# Patient Record
Sex: MALE | Race: WHITE | Employment: UNEMPLOYED | URBAN - METROPOLITAN AREA
[De-identification: names, ages, dates, MRNs, and addresses within clinical notes are randomized per-mention and may not be internally consistent; named-entity substitution may affect disease eponyms.]

---

## 2022-03-26 ENCOUNTER — HOSPITAL ENCOUNTER (EMERGENCY)
Age: 22
Discharge: HOME | End: 2022-03-29
Attending: EMERGENCY MEDICINE
Payer: MEDICAID

## 2022-03-26 DIAGNOSIS — R46.89 AGGRESSIVE BEHAVIOR: Primary | ICD-10-CM

## 2022-03-26 DIAGNOSIS — Z00.8 MEDICAL CLEARANCE FOR PSYCHIATRIC ADMISSION: ICD-10-CM

## 2022-03-26 LAB
ALBUMIN SERPL-MCNC: 4.1 G/DL (ref 3.5–5)
ALBUMIN/GLOB SERPL: 1.1 {RATIO} (ref 1.1–2.2)
ALP SERPL-CCNC: 70 U/L (ref 45–117)
ALT SERPL-CCNC: 115 U/L (ref 12–78)
AMPHET UR QL SCN: NEGATIVE
ANION GAP SERPL CALC-SCNC: 11 MMOL/L (ref 5–15)
AST SERPL-CCNC: 59 U/L (ref 15–37)
BARBITURATES UR QL SCN: NEGATIVE
BASOPHILS # BLD: 0 K/UL (ref 0–0.1)
BASOPHILS NFR BLD: 0 % (ref 0–1)
BENZODIAZ UR QL: NEGATIVE
BILIRUB SERPL-MCNC: 0.4 MG/DL (ref 0.2–1)
BUN SERPL-MCNC: 12 MG/DL (ref 6–20)
BUN/CREAT SERPL: 14 (ref 12–20)
CALCIUM SERPL-MCNC: 9.3 MG/DL (ref 8.5–10.1)
CANNABINOIDS UR QL SCN: NEGATIVE
CHLORIDE SERPL-SCNC: 105 MMOL/L (ref 97–108)
CO2 SERPL-SCNC: 25 MMOL/L (ref 21–32)
COCAINE UR QL SCN: NEGATIVE
CREAT SERPL-MCNC: 0.87 MG/DL (ref 0.7–1.3)
DIFFERENTIAL METHOD BLD: ABNORMAL
DRUG SCRN COMMENT,DRGCM: NORMAL
EOSINOPHIL # BLD: 0.1 K/UL (ref 0–0.4)
EOSINOPHIL NFR BLD: 1 % (ref 0–7)
ERYTHROCYTE [DISTWIDTH] IN BLOOD BY AUTOMATED COUNT: 12.4 % (ref 11.5–14.5)
ETHANOL SERPL-MCNC: <10 MG/DL
FLUAV RNA SPEC QL NAA+PROBE: NOT DETECTED
FLUBV RNA SPEC QL NAA+PROBE: NOT DETECTED
GLOBULIN SER CALC-MCNC: 3.8 G/DL (ref 2–4)
GLUCOSE SERPL-MCNC: 165 MG/DL (ref 65–100)
HCT VFR BLD AUTO: 47.7 % (ref 36.6–50.3)
HGB BLD-MCNC: 15.8 G/DL (ref 12.1–17)
IMM GRANULOCYTES # BLD AUTO: 0 K/UL (ref 0–0.04)
IMM GRANULOCYTES NFR BLD AUTO: 0 % (ref 0–0.5)
LYMPHOCYTES # BLD: 2.6 K/UL (ref 0.8–3.5)
LYMPHOCYTES NFR BLD: 34 % (ref 12–49)
MCH RBC QN AUTO: 28.9 PG (ref 26–34)
MCHC RBC AUTO-ENTMCNC: 33.1 G/DL (ref 30–36.5)
MCV RBC AUTO: 87.2 FL (ref 80–99)
METHADONE UR QL: NEGATIVE
MONOCYTES # BLD: 0.3 K/UL (ref 0–1)
MONOCYTES NFR BLD: 4 % (ref 5–13)
NEUTS SEG # BLD: 4.7 K/UL (ref 1.8–8)
NEUTS SEG NFR BLD: 61 % (ref 32–75)
NRBC # BLD: 0 K/UL (ref 0–0.01)
NRBC BLD-RTO: 0 PER 100 WBC
OPIATES UR QL: NEGATIVE
PCP UR QL: NEGATIVE
PLATELET # BLD AUTO: 250 K/UL (ref 150–400)
PMV BLD AUTO: 8.7 FL (ref 8.9–12.9)
POTASSIUM SERPL-SCNC: 3.8 MMOL/L (ref 3.5–5.1)
PROT SERPL-MCNC: 7.9 G/DL (ref 6.4–8.2)
RBC # BLD AUTO: 5.47 M/UL (ref 4.1–5.7)
SARS-COV-2, COV2: NOT DETECTED
SODIUM SERPL-SCNC: 141 MMOL/L (ref 136–145)
WBC # BLD AUTO: 7.7 K/UL (ref 4.1–11.1)

## 2022-03-26 PROCEDURE — 80307 DRUG TEST PRSMV CHEM ANLYZR: CPT

## 2022-03-26 PROCEDURE — 82077 ASSAY SPEC XCP UR&BREATH IA: CPT

## 2022-03-26 PROCEDURE — 99285 EMERGENCY DEPT VISIT HI MDM: CPT

## 2022-03-26 PROCEDURE — 80053 COMPREHEN METABOLIC PANEL: CPT

## 2022-03-26 PROCEDURE — 87636 SARSCOV2 & INF A&B AMP PRB: CPT

## 2022-03-26 PROCEDURE — 74011250637 HC RX REV CODE- 250/637: Performed by: EMERGENCY MEDICINE

## 2022-03-26 PROCEDURE — 36415 COLL VENOUS BLD VENIPUNCTURE: CPT

## 2022-03-26 PROCEDURE — 85025 COMPLETE CBC W/AUTO DIFF WBC: CPT

## 2022-03-26 RX ORDER — LORAZEPAM 1 MG/1
1 TABLET ORAL
Status: COMPLETED | OUTPATIENT
Start: 2022-03-26 | End: 2022-03-26

## 2022-03-26 RX ADMIN — LORAZEPAM 1 MG: 1 TABLET ORAL at 20:58
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
## 2022-03-26 NOTE — ED NOTES
Pt has bilateral wrist in handcuffs in the front of body. [place] [employer] at bedside. Nursing supervisor and Security made aware. Skin around handcuffs is dry, intact, and no sign of redness or breakdown. \"Metal restaint in use\" sign up on door.

## 2022-03-26 NOTE — ED NOTES
Pt provided with [place] ([facility]) sandwich, ginger ale and warm blanket.
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
## 2022-03-26 NOTE — ED NOTES
Bedside and Verbal shift change report given to Junito Paez RN (oncoming nurse) by Blaine Ramos RN (offgoing nurse). Report included the following information SBAR, ED Summary, MAR and Recent Results.

## 2022-03-27 RX ORDER — DIVALPROEX SODIUM 500 MG/1
1500 TABLET, EXTENDED RELEASE ORAL
COMMUNITY

## 2022-03-27 RX ORDER — HALOPERIDOL DECANOATE 100 MG/ML
100 INJECTION INTRAMUSCULAR
COMMUNITY

## 2022-03-27 RX ORDER — ACETAMINOPHEN 500 MG
2000 TABLET ORAL DAILY
COMMUNITY

## 2022-03-27 RX ORDER — LORAZEPAM 1 MG/1
1 TABLET ORAL
Status: ACTIVE | OUTPATIENT
Start: 2022-03-27 | End: 2022-03-27

## 2022-03-27 RX ORDER — CLONAZEPAM 2 MG/1
2 TABLET ORAL 2 TIMES DAILY
COMMUNITY

## 2022-03-27 RX ORDER — LORAZEPAM 1 MG/1
1 TABLET ORAL
COMMUNITY

## 2022-03-27 RX ORDER — LEVOTHYROXINE SODIUM 50 UG/1
50 TABLET ORAL
COMMUNITY

## 2022-03-27 RX ORDER — CHOLECALCIFEROL (VITAMIN D3) 50 MCG
CAPSULE ORAL
COMMUNITY
End: 2022-03-28

## 2022-03-27 RX ORDER — LISINOPRIL 10 MG/1
10 TABLET ORAL DAILY
COMMUNITY

## 2022-03-27 RX ORDER — PROPRANOLOL HYDROCHLORIDE 120 MG/1
120 CAPSULE, EXTENDED RELEASE ORAL
COMMUNITY

## 2022-03-27 NOTE — ED NOTES
Patients medications delivered, home medications stored and labeled in black belongings locker labeled, bagged and sealed.

## 2022-03-27 NOTE — ED NOTES
Pt taken out of handcuffs by [employer] to draw blood work. Skin on wrist in is dry, intact, and no signs of redness or breakdown.

## 2022-03-27 NOTE — ED NOTES
Spoke with Lexx Doss (alton) from the [facility] (group home) about the patient's home medications. Please call [phone] for any questions or concerns.

## 2022-03-27 NOTE — ED NOTES
Spoke w/ Pt mother Aletha Gu, #[phone]. Mother is upset with the process so far, and issues with group home getting medication list and physical medications. Updated mother on current plan of care and TDO status. Transferred Mother to pt room, pt currently speaking with mom.

## 2022-03-27 NOTE — ED NOTES
Spoke w/ Mykole w/ [facility] she asked to fax over lab results. Fax confirmation received. Called back RE[facility] to confirm fax was received. LVM for call back.

## 2022-03-27 NOTE — ED NOTES
Pt becoming frustrated with process. Stating he wants to go to [facility] I explained we cannot transfer him until we have the forms completed from mom, and the medication. Pt escalating and becoming more agitated.

## 2022-03-27 NOTE — ED NOTES
Verbal shift change report given to Karyle Pee, RN (oncoming nurse) by Antolin De La Garza RN (offgoing nurse). Report included the following information SBAR, ED Summary, Intake/Output, MAR and Recent Results.

## 2022-03-27 NOTE — ED NOTES
Bedside and Verbal shift change report given to Gracie Zhang RN (oncoming nurse) by Ginny Gordillo RN (offgoing nurse). Report included the following information SBAR.

## 2022-03-27 NOTE — ED NOTES
Spoke w/ Mr. Mervat Saini #[phone], Manager of the group home. Mr. Mervat Saini stated he doesn't have anyone that is able to bring the pts medications until tomorrow. Called LESLIE Tamayo #[phone](QUINCY) she advised she is waiting for group [facility] to fax over medication transcripts to complete the paperwork but pt will not be able to be transferred to [facility] crisis stabilization home for [facility] until we have his medications. Bear emailed me the paperwork for ED MD to complete.  Unable to complete the medication order form until we receive the med transcript list.

## 2022-03-27 NOTE — ED NOTES
Spoke w/ [facility] they advised that Pts mother is unable to complete the admission forms due to not having access to a printer, ED MD not willing to complete the forms RE[facility] is requesting per [facility] policy, and pt unable to be transferred to [facility] without his medications. Seema Ray w/ [facility] looped in.

## 2022-03-27 NOTE — ED NOTES
Pt asked to call his mother, pt seems to be agitated about the watch, Yocasta Lugo MD aware, will continue to monitor.

## 2022-03-27 NOTE — ED NOTES
Spoke with Karel Miranda ([facility]), pt currently on 7th hour TDO, stated that currently there are no beds available, still searching for bed as no facility has accepted the pt.

## 2022-03-27 NOTE — ED NOTES
Paul Points with [facility] stated she is giving [facility] 5-10mins to come to a resolution for this Pt or she will be pursuing a TDO so pt isn't in limbo until tomorrow which is the current update for [facility].

## 2022-03-28 PROCEDURE — 74011250637 HC RX REV CODE- 250/637: Performed by: EMERGENCY MEDICINE

## 2022-03-28 RX ORDER — DIVALPROEX SODIUM 500 MG/1
1500 TABLET, EXTENDED RELEASE ORAL
Status: DISCONTINUED | OUTPATIENT
Start: 2022-03-28 | End: 2022-03-29 | Stop reason: HOSPADM

## 2022-03-28 RX ORDER — LORAZEPAM 1 MG/1
1 TABLET ORAL
Status: DISCONTINUED | OUTPATIENT
Start: 2022-03-28 | End: 2022-03-29 | Stop reason: HOSPADM

## 2022-03-28 RX ORDER — OMEGA-3-ACID ETHYL ESTERS 1 G/1
1 CAPSULE, LIQUID FILLED ORAL DAILY
Status: DISCONTINUED | OUTPATIENT
Start: 2022-03-29 | End: 2022-03-28

## 2022-03-28 RX ORDER — LORAZEPAM 1 MG/1
1 TABLET ORAL AS NEEDED
Status: DISCONTINUED | OUTPATIENT
Start: 2022-03-28 | End: 2022-03-28

## 2022-03-28 RX ORDER — LISINOPRIL 5 MG/1
10 TABLET ORAL DAILY
Status: DISCONTINUED | OUTPATIENT
Start: 2022-03-29 | End: 2022-03-29 | Stop reason: HOSPADM

## 2022-03-28 RX ORDER — CLONAZEPAM 0.5 MG/1
2 TABLET ORAL 2 TIMES DAILY
Status: DISCONTINUED | OUTPATIENT
Start: 2022-03-28 | End: 2022-03-29 | Stop reason: HOSPADM

## 2022-03-28 RX ORDER — CHOLECALCIFEROL (VITAMIN D3) 50 MCG
1 CAPSULE ORAL
COMMUNITY

## 2022-03-28 RX ORDER — MELATONIN
2000 DAILY
Status: DISCONTINUED | OUTPATIENT
Start: 2022-03-29 | End: 2022-03-29 | Stop reason: HOSPADM

## 2022-03-28 RX ORDER — LEVOTHYROXINE SODIUM 50 UG/1
50 TABLET ORAL
Status: DISCONTINUED | OUTPATIENT
Start: 2022-03-29 | End: 2022-03-29 | Stop reason: HOSPADM

## 2022-03-28 RX ORDER — LORAZEPAM 1 MG/1
1 TABLET ORAL
Status: DISCONTINUED | OUTPATIENT
Start: 2022-03-28 | End: 2022-03-28 | Stop reason: SDUPTHER

## 2022-03-28 RX ORDER — PROPRANOLOL HYDROCHLORIDE 60 MG/1
120 CAPSULE, EXTENDED RELEASE ORAL
Status: DISCONTINUED | OUTPATIENT
Start: 2022-03-28 | End: 2022-03-29 | Stop reason: HOSPADM

## 2022-03-28 RX ADMIN — PROPRANOLOL HYDROCHLORIDE 120 MG: 60 CAPSULE, EXTENDED RELEASE ORAL at 21:26

## 2022-03-28 RX ADMIN — CLONAZEPAM 2 MG: 0.5 TABLET ORAL at 19:15

## 2022-03-28 RX ADMIN — LORAZEPAM 1 MG: 1 TABLET ORAL at 03:37

## 2022-03-28 RX ADMIN — DIVALPROEX SODIUM 1500 MG: 500 TABLET, EXTENDED RELEASE ORAL at 21:26

## 2022-03-28 NOTE — ED NOTES
This RN spoke to Angelito with Crisis. She says that the patient will most likely have their case escalated to [facility] and have a hearing tomorrow. Angelito says she is trying to get the hearing for Tuesday to give us more time for a bed search.

## 2022-03-28 NOTE — ED NOTES
Pt placed in paper scrubs, two belonging bags with clothes and shoes labeled and wanded by security.

## 2022-03-28 NOTE — PROGRESS NOTES
[facility]: MED RECONCILIATION    Comments/Recommendations:   [facility] group [facility] reports patient has only been with them since last Thursday  Per group home, next haloperidol decanoate injection is due 22    Medications adjusted:  Divalproex ER to bedtime  Propranolol SR to bedtime  Fish oil to daily with dinner    Information obtained from: Staff member at [employer] [facility] [place] ([phone])    Allergies: Patient has no known allergies. Prior to Admission Medications:   Prior to Admission Medications   Prescriptions Last Dose Informant Patient Reported? Taking? LORazepam (ATIVAN) 1 mg tablet  Care Giver Yes Yes   Sig: Take 1 mg by mouth every six (6) hours as needed for Anxiety. cholecalciferol (VITAMIN D3) (2,000 UNITS /50 MCG) cap capsule  Care Giver Yes Yes   Sig: Take 2,000 Units by mouth daily. Indications: low vitamin D levels   clonazePAM (KlonoPIN) 2 mg tablet  Care Giver Yes Yes   Sig: Take 2 mg by mouth two (2) times a day. divalproex ER (Depakote ER) 500 mg ER tablet  Care Giver Yes Yes   Sig: Take 1,500 mg by mouth nightly.   haloperidol decanoate (HALDOL DECANOATE) 100 mg/mL injection Next due 22 Care Giver Yes Yes   Si mg by IntraMUSCular route every twenty-eight (28) days. levothyroxine (SYNTHROID) 50 mcg tablet  Care Giver Yes Yes   Sig: Take 50 mcg by mouth Daily (before breakfast). Indications: a condition with low thyroid hormone levels   lisinopriL (PRINIVIL, ZESTRIL) 10 mg tablet  Care Giver Yes Yes   Sig: Take 10 mg by mouth daily. omega 3-dha-epa-fish oil (Fish OiL) 100-160-1,000 mg cap  Care Giver Yes Yes   Sig: Take 1 Capsule by mouth daily (with dinner). propranolol LA (INDERAL LA) 120 mg SR capsule  Care Giver Yes Yes   Sig: Take 120 mg by mouth nightly.         Thank you,  Pamela Carey, PharmD, BCPS  [phone]

## 2022-03-28 NOTE — ED NOTES
Pt updated on POC. He says he may need something to help him sleep. 1 mg PO Ativan verbal order from Dr. Chema Sinha. Pt in room watching youtube videos and laughing.

## 2022-03-28 NOTE — ED NOTES
Lexa Conteh was at bedside to talk to patient, left at this time and told patient that Diedra July would be seeing him soon.  outside room to monitor patient. 1400: Diedra July,  at bedside at this time.
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
## 2022-03-28 NOTE — PROGRESS NOTES
Pharmacy Note    This patients fish oil has been discontinued during the hospitalization per hospital policy. If deemed medically necessary, the medication can be reordered by the prescriber. The patient will then be asked to bring in their own supply of medication.     Thank you,  Jaylen Boles, PharmD, BCPS  [phone]

## 2022-03-28 NOTE — ED NOTES
Per courts, they spoke with patients mother who is legal guardian and stated to the courts that reach was supposed to be attempting to get patient into a stabilization treatment program and once that was completed he would be allowed back into the group home.

## 2022-03-28 NOTE — ED NOTES
Mother, German Griffin, contacted [facility] program for admission into their program. REACH directed to speak to [facility].

## 2022-03-28 NOTE — ED NOTES
Patient has been instructed that they have been given ativan* which contains opioids, benzodiazepines, or other sedating drugs. Patient is aware that they  will need to refrain from driving or operating heavy machinery after taking this medication. Patient also instructed that they need to avoid drinking alcohol and using other products containing opioids, benzodiazepines, or other sedating drugs. Patient verbalized understanding.       Pt is being admitted

## 2022-03-28 NOTE — PROGRESS NOTES
CM received consult from ED for patient who was brought to ED under an Emergency Custody Order, and was subsequently TDOd. The Commitment hearing was held today and patient was committed for mental health treatment. Courtney Sanchez in [facility] is working on finding a bed for him. JORDIN spoke with Courtney Sanchez, and he does not need CM assistance at this time, as he will speak with all relevant parties.

## 2022-03-29 VITALS
TEMPERATURE: 97.6 F | DIASTOLIC BLOOD PRESSURE: 97 MMHG | HEIGHT: 73 IN | RESPIRATION RATE: 18 BRPM | HEART RATE: 97 BPM | BODY MASS INDEX: 38.43 KG/M2 | SYSTOLIC BLOOD PRESSURE: 139 MMHG | WEIGHT: 290 LBS | OXYGEN SATURATION: 99 %

## 2022-03-29 LAB
FLUAV RNA SPEC QL NAA+PROBE: NOT DETECTED
FLUBV RNA SPEC QL NAA+PROBE: NOT DETECTED
SARS-COV-2, COV2: NOT DETECTED

## 2022-03-29 PROCEDURE — 87636 SARSCOV2 & INF A&B AMP PRB: CPT

## 2022-03-29 PROCEDURE — 74011250637 HC RX REV CODE- 250/637: Performed by: EMERGENCY MEDICINE

## 2022-03-29 RX ADMIN — Medication 2000 UNITS: at 10:12

## 2022-03-29 RX ADMIN — LISINOPRIL 10 MG: 5 TABLET ORAL at 10:12

## 2022-03-29 RX ADMIN — CLONAZEPAM 2 MG: 0.5 TABLET ORAL at 10:13

## 2022-03-29 RX ADMIN — LEVOTHYROXINE SODIUM 50 MCG: 50 TABLET ORAL at 08:14

## 2022-03-29 NOTE — ED NOTES
Patient resting in bed quietly. Lights dimmed to comfort. Sitter at bedside. Pt cooperative with night time medications.

## 2022-03-29 NOTE — CONSULTS
PSYCHIATRY CONSULT NOTE:    REASON FOR CONSULT:  depression and agitation  out of control behavior    HISTORY OF PRESENTING COMPLAINT:  Dagmar Price is a 23 y.o. male who was brought by [facility] for ECO for mental health evaluation. Police report an altercation at a group home, patient denies episode today. Patient calm and cooperative on arrival.  Patient denies SI or HI. Patient denies hallucinations or delusions. Patient states \"I'd just like to go back to my group home. \"      Dagmar Price reports feeling well and moods are good. Says that he was on an outing from the group home and when they returned, the police were there and took him out. Denies SI/HI/AH/VH. Denies getting into and altercation or breaking anything up in the home. No aggression or violence. Appropriately interactive and aware. Tolerating medications well. Eating and sleeping fairly. States that he wants to go home now in a semi-childlike manor. The officer on duty reports no behaviors and states that the PD did not take out the ECO on him but the group home. 3/29 - Dagmar Price reports feeling well and moods are good. Denies SI/HI/AH/VH. No aggression or violence. Appropriately interactive and aware. Tolerating medications well. Eating and sleeping fairly. Interested in returning to his group home. Notes that he was in a hospital prior to his group home and his mother does not want him to go to another hospital.  When asked about his interactions at the group home, he stated that he gets nervous around \"jumpy\" animated people and the people in the home were elevated by his ascertion. When he is among such chaos, he tends to want to leave he stated. This will explain his elopement behaviors. When he feels trapped then he lashes out explaining the other behaviors prior to ED visit.   He has been calm and cooperative with this interviewer and by all accounts per record for 3 days and does not meet inpatient criteria. By report he needs a stabilization program prior to returning to his group home. The best plan in this case would be for [facility] to place him in a [facility] that has the programing and facility to manage his level of care and needs. PAST PSYCHIATRIC HISTORY:  Therapy, Out Patient ID    SUBSTANCE ABUSE HISTORY:  Social History     Substance and Sexual Activity   Drug Use Never     Social History     Substance and Sexual Activity   Alcohol Use Not Currently     Social History     Tobacco Use   Smoking Status Never Smoker   Smokeless Tobacco Never Used       PAST MEDICAL HISTORY:  History reviewed. No pertinent past medical history. SOCIAL HISTORY:  Lives in a group home. Denies SI/HI/AH/VH. VITALS:  Visit Vitals  /71 (BP 1 Location: Left upper arm, BP Patient Position: At rest)   Pulse 95   Temp 98 °F (36.7 °C)   Resp 17   Ht 6' 1\" (1.854 m)   Wt 131.5 kg (290 lb)   SpO2 98%   BMI 38.26 kg/m²       MEDICATIONS:    Current Facility-Administered Medications:     cholecalciferol (VITAMIN D3) (1000 Units /25 mcg) tablet 2,000 Units, 2,000 Units, Oral, DAILY, Erin Ruth MD    clonazePAM (KlonoPIN) tablet 2 mg, 2 mg, Oral, BID, Erin Ruth MD, 2 mg at 03/28/22 1915    levothyroxine (SYNTHROID) tablet 50 mcg, 50 mcg, Oral, ACB, Erin Ruth MD, 50 mcg at 03/29/22 3938    lisinopriL (PRINIVIL, ZESTRIL) tablet 10 mg, 10 mg, Oral, DAILY, Erin Ruth MD    LORazepam (ATIVAN) tablet 1 mg, 1 mg, Oral, Q6H PRN, Eirn Ruth MD    propranolol LA (INDERAL LA) capsule 120 mg, 120 mg, Oral, QHS, Erin Ruth MD, 120 mg at 03/28/22 2126    divalproex ER (DEPAKOTE ER) 24 hour tablet 1,500 mg, 1,500 mg, Oral, QHS, Erin Ruth MD, 1,500 mg at 03/28/22 2126    Current Outpatient Medications:     omega 3-dha-epa-fish oil (Fish OiL) 100-160-1,000 mg cap, Take 1 Capsule by mouth daily (with dinner). , Disp: , Rfl:     propranolol LA (INDERAL LA) 120 mg SR capsule, Take 120 mg by mouth nightly., Disp: , Rfl:     cholecalciferol (VITAMIN D3) (2,000 UNITS /50 MCG) cap capsule, Take 2,000 Units by mouth daily. Indications: low vitamin D levels, Disp: , Rfl:     clonazePAM (KlonoPIN) 2 mg tablet, Take 2 mg by mouth two (2) times a day., Disp: , Rfl:     levothyroxine (SYNTHROID) 50 mcg tablet, Take 50 mcg by mouth Daily (before breakfast). Indications: a condition with low thyroid hormone levels, Disp: , Rfl:     lisinopriL (PRINIVIL, ZESTRIL) 10 mg tablet, Take 10 mg by mouth daily. , Disp: , Rfl:     LORazepam (ATIVAN) 1 mg tablet, Take 1 mg by mouth every six (6) hours as needed for Anxiety. , Disp: , Rfl:     divalproex ER (Depakote ER) 500 mg ER tablet, Take 1,500 mg by mouth nightly., Disp: , Rfl:     haloperidol decanoate (HALDOL DECANOATE) 100 mg/mL injection, 100 mg by IntraMUSCular route every twenty-eight (28) days. , Disp: , Rfl:     MENTAL STATUS EXAM:  Calm, cooperative, clear coherent speech   Person/place only  Denies SI/HI/AH/VH. No aggression or violence  Goal directed    ASSESSMENT AND PLAN:  Adjustment Disorder NOS, Mental retardation, severity unknown, Problems with primary support group, Problems related to social environment and Other psychosocial or environmental problems  and 71-80 if symptoms are present, they are transient and expectable reactions to stressors    RECOMMENDATIONS:  Not a candidate for inpatient psychiatry at this time  Discontinue 1:1 nursing  Return to group home   [employer] for classes or contact [facility] for a [facility] respite bed.   Thank you for this consultation    Orlin Dobbs MD  3/29/2022

## 2022-03-29 NOTE — ED NOTES
Verbal shift change report given to Wen Goode  (oncoming nurse) by Judy Rutherford RN  (offgoing nurse). Report included the following information SBAR.